# Patient Record
Sex: FEMALE | Race: BLACK OR AFRICAN AMERICAN | NOT HISPANIC OR LATINO | Employment: OTHER | URBAN - METROPOLITAN AREA
[De-identification: names, ages, dates, MRNs, and addresses within clinical notes are randomized per-mention and may not be internally consistent; named-entity substitution may affect disease eponyms.]

---

## 2022-09-07 ENCOUNTER — OFFICE VISIT (OUTPATIENT)
Dept: OPHTHALMOLOGY | Facility: CLINIC | Age: 62
End: 2022-09-07
Payer: MEDICARE

## 2022-09-07 DIAGNOSIS — H40.53X3 NEOVASCULAR GLAUCOMA OF BOTH EYES, SEVERE STAGE: Primary | ICD-10-CM

## 2022-09-07 DIAGNOSIS — E11.3553 STABLE PROLIFERATIVE DIABETIC RETINOPATHY OF BOTH EYES ASSOCIATED WITH TYPE 2 DIABETES MELLITUS: ICD-10-CM

## 2022-09-07 DIAGNOSIS — H35.419 RETINAL LATTICE DEGENERATION, UNSPECIFIED LATERALITY: ICD-10-CM

## 2022-09-07 PROCEDURE — 99999 PR PBB SHADOW E&M-EST. PATIENT-LVL III: CPT | Mod: PBBFAC,,, | Performed by: STUDENT IN AN ORGANIZED HEALTH CARE EDUCATION/TRAINING PROGRAM

## 2022-09-07 PROCEDURE — 99213 OFFICE O/P EST LOW 20 MIN: CPT | Mod: PBBFAC | Performed by: STUDENT IN AN ORGANIZED HEALTH CARE EDUCATION/TRAINING PROGRAM

## 2022-09-07 PROCEDURE — 99999 PR PBB SHADOW E&M-EST. PATIENT-LVL III: ICD-10-PCS | Mod: PBBFAC,,, | Performed by: STUDENT IN AN ORGANIZED HEALTH CARE EDUCATION/TRAINING PROGRAM

## 2022-09-07 PROCEDURE — 99205 OFFICE O/P NEW HI 60 MIN: CPT | Mod: S$PBB,,, | Performed by: STUDENT IN AN ORGANIZED HEALTH CARE EDUCATION/TRAINING PROGRAM

## 2022-09-07 PROCEDURE — 99205 PR OFFICE/OUTPT VISIT, NEW, LEVL V, 60-74 MIN: ICD-10-PCS | Mod: S$PBB,,, | Performed by: STUDENT IN AN ORGANIZED HEALTH CARE EDUCATION/TRAINING PROGRAM

## 2022-09-07 RX ORDER — LATANOPROST 50 UG/ML
1 SOLUTION/ DROPS OPHTHALMIC NIGHTLY
Qty: 5 ML | Refills: 6 | Status: SHIPPED | OUTPATIENT
Start: 2022-09-07

## 2022-09-07 RX ORDER — TIMOLOL MALEATE 5 MG/ML
1 SOLUTION/ DROPS OPHTHALMIC 2 TIMES DAILY
Qty: 10 ML | Refills: 12 | Status: SHIPPED | OUTPATIENT
Start: 2022-09-07

## 2022-09-07 RX ORDER — MYCOPHENOLIC ACID 360 MG/1
TABLET, DELAYED RELEASE ORAL
COMMUNITY
Start: 2022-02-05

## 2022-09-07 RX ORDER — BRIMONIDINE TARTRATE 2 MG/ML
1 SOLUTION/ DROPS OPHTHALMIC 2 TIMES DAILY
Qty: 10 ML | Refills: 12 | Status: SHIPPED | OUTPATIENT
Start: 2022-09-07 | End: 2023-09-07

## 2022-09-07 RX ORDER — HYDROCODONE BITARTRATE AND ACETAMINOPHEN 10; 325 MG/1; MG/1
1 TABLET ORAL
COMMUNITY

## 2022-09-07 RX ORDER — DORZOLAMIDE HCL 20 MG/ML
1 SOLUTION/ DROPS OPHTHALMIC 2 TIMES DAILY
Qty: 10 ML | Refills: 12 | Status: SHIPPED | OUTPATIENT
Start: 2022-09-07

## 2022-09-07 RX ORDER — FERROUS GLUCONATE 324(38)MG
324 TABLET ORAL
COMMUNITY

## 2022-09-07 RX ORDER — ASPIRIN 81 MG/1
TABLET ORAL
COMMUNITY
Start: 2022-02-05

## 2022-09-07 RX ORDER — GABAPENTIN 300 MG/1
CAPSULE ORAL
COMMUNITY
Start: 2022-02-05

## 2022-09-07 RX ORDER — PEN NEEDLE, DIABETIC 33 GX5/32"
NEEDLE, DISPOSABLE MISCELLANEOUS
COMMUNITY

## 2022-09-07 RX ORDER — CARVEDILOL 12.5 MG/1
TABLET ORAL
COMMUNITY
Start: 2022-02-05

## 2022-09-07 RX ORDER — ESCITALOPRAM OXALATE 10 MG/1
TABLET ORAL
COMMUNITY
Start: 2022-02-05

## 2022-09-07 RX ORDER — MULTIVITAMIN
TABLET ORAL
COMMUNITY

## 2022-09-07 RX ORDER — PREDNISONE 5 MG/1
TABLET ORAL
COMMUNITY
Start: 2022-02-05

## 2022-09-07 RX ORDER — BLOOD SUGAR DIAGNOSTIC
STRIP MISCELLANEOUS
COMMUNITY

## 2022-09-07 RX ORDER — FUROSEMIDE 40 MG/1
TABLET ORAL
COMMUNITY
Start: 2022-02-05

## 2022-09-07 RX ORDER — LISINOPRIL 20 MG/1
20 TABLET ORAL DAILY
COMMUNITY
Start: 2022-02-05

## 2022-09-07 NOTE — PROGRESS NOTES
HPI     Eye Problem     Additional comments: Patient is here for second opinion on right eye.   Patient states her OD is said to be blind. Patient states she is using   latanoprost.          Last edited by Abhi Jaramillo on 9/7/2022 11:55 AM.            Assessment /Plan     For exam results, see Encounter Report.     NVG of both eyes, severe stage: NVI noted OD. no NVI noted OS  but PDR OU s/p PRP w/  PAS noted on gonioscopy OD>OS. Patient has had hydrus plased OS. IOP 6/42 at presentation today. IOP lowered to 30 with administration of brimonidine, timolol, and dorzolamide x2 each. No NVI or active NVE OS. Will have patient f/u with Dr. Cooper for consideration of IVFA posterior and anterior segment. Will start aggressive IOP lowering drops OS and recheck IOP tomorrow morning.   Continue Latanoprost qHS OS  Start timolol BID OS, dorzolamide BID OS and Brimonidine BID OS    Stable proliferative diabetic retinopathy of both eyes associated with type 2 diabetes mellitus  F/u with [facility] as above    Retinal lattice degeneration, OS   F/u w/ [facility] as above    RTC tomorrow 9/9/2022 for IOP check with [facility]  Patient will see [facility] today

## 2022-09-07 NOTE — PATIENT INSTRUCTIONS
Latanoprost (teal top) at bedtime (ex. 8 pm) the left eye  Timolol (yellow top) morning (ex. 8 am) the left eye  Brimonidine (purple top) 2x daily  the left eye (ex. 8 am and 8 pm)  Dorzolamide (orange top) 2x daily the left eye  Wait 5 minutes in between drops

## 2022-09-08 ENCOUNTER — TELEPHONE (OUTPATIENT)
Dept: OPHTHALMOLOGY | Facility: CLINIC | Age: 62
End: 2022-09-08
Payer: MEDICARE

## 2022-09-08 NOTE — TELEPHONE ENCOUNTER
Called pt to reschedule her appointment. Pt stated her appointment was for tomorrow. I didn't see an appointment in the chart for the pt so I told her about the appointment I saw in her chart. Pt stated she was aware of that appointment and wanted to keep it on 9-20 at 8:00. Pt stated she couldn't come in any sooner because she didn't have a ride.

## 2022-09-20 ENCOUNTER — TELEPHONE (OUTPATIENT)
Dept: OPHTHALMOLOGY | Facility: CLINIC | Age: 62
End: 2022-09-20
Payer: MEDICARE

## 2022-09-20 NOTE — TELEPHONE ENCOUNTER
Called pt due to her missing her appointment. No answer. Couldn't leave a message because the mailbox was full. LAUREN

## 2022-09-23 ENCOUNTER — NURSE TRIAGE (OUTPATIENT)
Dept: ADMINISTRATIVE | Facility: CLINIC | Age: 62
End: 2022-09-23
Payer: MEDICARE

## 2022-09-23 ENCOUNTER — OFFICE VISIT (OUTPATIENT)
Dept: OPHTHALMOLOGY | Facility: CLINIC | Age: 62
End: 2022-09-23
Payer: MEDICARE

## 2022-09-23 DIAGNOSIS — E11.3553 STABLE PROLIFERATIVE DIABETIC RETINOPATHY OF BOTH EYES ASSOCIATED WITH TYPE 2 DIABETES MELLITUS: ICD-10-CM

## 2022-09-23 DIAGNOSIS — H40.53X3 NEOVASCULAR GLAUCOMA OF BOTH EYES, SEVERE STAGE: Primary | ICD-10-CM

## 2022-09-23 PROCEDURE — 99999 PR PBB SHADOW E&M-EST. PATIENT-LVL III: ICD-10-PCS | Mod: PBBFAC,,, | Performed by: STUDENT IN AN ORGANIZED HEALTH CARE EDUCATION/TRAINING PROGRAM

## 2022-09-23 PROCEDURE — 99213 OFFICE O/P EST LOW 20 MIN: CPT | Mod: PBBFAC | Performed by: STUDENT IN AN ORGANIZED HEALTH CARE EDUCATION/TRAINING PROGRAM

## 2022-09-23 PROCEDURE — 99214 OFFICE O/P EST MOD 30 MIN: CPT | Mod: S$PBB,,, | Performed by: STUDENT IN AN ORGANIZED HEALTH CARE EDUCATION/TRAINING PROGRAM

## 2022-09-23 PROCEDURE — 99999 PR PBB SHADOW E&M-EST. PATIENT-LVL III: CPT | Mod: PBBFAC,,, | Performed by: STUDENT IN AN ORGANIZED HEALTH CARE EDUCATION/TRAINING PROGRAM

## 2022-09-23 PROCEDURE — 99214 PR OFFICE/OUTPT VISIT, EST, LEVL IV, 30-39 MIN: ICD-10-PCS | Mod: S$PBB,,, | Performed by: STUDENT IN AN ORGANIZED HEALTH CARE EDUCATION/TRAINING PROGRAM

## 2022-09-23 NOTE — TELEPHONE ENCOUNTER
Patient called for address of appointment scheduled for 9/27/22. Patient advised that appointment scheduled for 9/27/22 @ 9:15 AM is at [id number] [facility]. [place]. [id number]. Patient's daughter-in-law wrote address of appointment for patient. Patient cites understanding of date/time/location of appointment and states no additional needs at this time.    Reason for Disposition   General information question, no triage required and triager able to answer question    Additional Information   Negative: [1] Caller is not with the adult (patient) AND [2] reporting urgent symptoms   Negative: Lab result questions   Negative: Medication questions   Negative: Caller can't be reached by phone   Negative: Caller has already spoken to PCP or another triager   Negative: RN needs further essential information from caller in order to complete triage   Negative: Requesting regular office appointment   Negative: [1] Caller requesting NON-URGENT health information AND [2] PCP's office is the best resource   Negative: Health Information question, no triage required and triager able to answer question    Protocols used: Information Only Call - No Triage-A-

## 2022-09-23 NOTE — PATIENT INSTRUCTIONS
BOTH EYES:  Timolol (yellow top) 2x daily   Brimonidine (purple top) 2x daily   Dorzolamide (orange top) 2x daily  Latanoprost (teal top) at bedtime

## 2022-09-27 ENCOUNTER — TELEPHONE (OUTPATIENT)
Dept: OPHTHALMOLOGY | Facility: CLINIC | Age: 62
End: 2022-09-27
Payer: MEDICARE

## 2022-09-27 NOTE — TELEPHONE ENCOUNTER
Called pt back about rescheduling her appointment. Pt states she will call transportation to bring her to her appointment with Dr. Robb on tomorrow. Pt stated her transportation didn't come this morning for her appointment with Dr. Restrepo.

## 2022-09-28 ENCOUNTER — TELEPHONE (OUTPATIENT)
Dept: OPHTHALMOLOGY | Facility: CLINIC | Age: 62
End: 2022-09-28
Payer: MEDICARE

## 2022-09-28 NOTE — TELEPHONE ENCOUNTER
----- Message from Yamilet Wilhelm sent at 9/28/2022  2:38 PM CDT -----  Contact: pt  The pt request a return call, no additional info given and can be reached at [phone]///[email or web address]

## 2022-09-28 NOTE — TELEPHONE ENCOUNTER
----- Message from Maryjo Pastrana sent at 9/28/2022 11:45 AM CDT -----  Please call pt @ [phone] regarding specialist she was sent to on [address], pt lost information, need to get it again, pt need change time for appt today, has no transportation, need to go later,

## 2022-09-28 NOTE — TELEPHONE ENCOUNTER
Returned call to patient.  The first call she could not hear me.  I attempted twice more but she did not answer.  I left a voicemail for her to return our call.

## 2022-09-28 NOTE — TELEPHONE ENCOUNTER
Called patient to give Dr. Robb's number to patient. Patient did not answer, left detailed voicemail and call back number.----- Message from Makenna Dao sent at 9/28/2022 12:36 PM CDT -----  Contact: BELINDA Miner@[phone]--  PT calling to speak with the nurse regarding questions appointment. Please call to advise.

## 2022-09-28 NOTE — TELEPHONE ENCOUNTER
I was able to speak with Ms. Wilhelm and confirm the number for Dr. Robb so she could reschedule her appointment.